# Patient Record
Sex: FEMALE | Race: WHITE | NOT HISPANIC OR LATINO | Employment: OTHER | URBAN - METROPOLITAN AREA
[De-identification: names, ages, dates, MRNs, and addresses within clinical notes are randomized per-mention and may not be internally consistent; named-entity substitution may affect disease eponyms.]

---

## 2017-06-14 ENCOUNTER — HOSPITAL ENCOUNTER (OUTPATIENT)
Dept: MAMMOGRAPHY | Facility: CLINIC | Age: 57
Discharge: HOME | End: 2017-06-14
Attending: FAMILY MEDICINE | Admitting: FAMILY MEDICINE
Payer: COMMERCIAL

## 2017-06-14 DIAGNOSIS — Z12.31 VISIT FOR SCREENING MAMMOGRAM: ICD-10-CM

## 2017-06-14 PROCEDURE — 77063 BREAST TOMOSYNTHESIS BI: CPT

## 2017-06-14 PROCEDURE — G0202 SCR MAMMO BI INCL CAD: HCPCS

## 2017-12-22 ENCOUNTER — TRANSFERRED RECORDS (OUTPATIENT)
Dept: HEALTH INFORMATION MANAGEMENT | Facility: CLINIC | Age: 57
End: 2017-12-22

## 2018-03-28 ENCOUNTER — TELEPHONE (OUTPATIENT)
Dept: NEUROLOGY | Facility: CLINIC | Age: 58
End: 2018-03-28

## 2018-03-28 NOTE — TELEPHONE ENCOUNTER
Spoke with patient. She had a session of cryotherapy where she had to lower her core temp to 40 degrees. She had booties and gloves on but since then her foot that is always numb is more numb. No color changes to suggest frostbite.    She will just observe. Not repeat the session/ If not back to baseline next week she will call back.    Asmita Antoine MD [facility]  Department of Neurology            ----- Message from Katie Garcia RN sent at 3/28/2018  9:53 AM CDT -----  Regarding: FW: Pt saw Dr. Antoine at [facility], has questions  Contact: [phone]  Please see below.... Previous patient of yours. Should I have her come in for an appointment?    Thanks,  Norma  ----- Message -----     From: Katie Garcia RN     Sent: 3/28/2018   7:06 AM       To: Katie Garcia RN  Subject: FW: Pt saw Dr. Antoine at [facility]        ----- Message -----     From: Ria Ko RN     Sent: 3/27/2018   1:04 PM       To: Qing Nath RN  Subject: FW: Pt saw Dr. Antoine at [facility]        ----- Message -----     From: Lucrecia Saldana     Sent: 3/27/2018  12:58 PM       To: [facility] Adult [facility]  Subject: Pt saw Dr. Antoine at [facility], h#    Pt established with Dr. Antoine at [facility] calling needing advice. Pt has MS and numbness of the feet. She did a 2 minute cryotherapy session for her birthday today and her R foot is very numb now.   Pt would appreciate a call back to discuss what she should do.  Pt can be reached at [phone].    Thank you!  ~Lucrecia    Please DO NOT send this message and/or reply back to sender. Call Center Representatives DO NOT respond to messages.

## 2019-11-06 ENCOUNTER — TELEPHONE (OUTPATIENT)
Dept: NEUROLOGY | Facility: CLINIC | Age: 59
End: 2019-11-06

## 2019-11-06 NOTE — TELEPHONE ENCOUNTER
[facility] Call Center    Phone Message    May a detailed message be left on voicemail: no    Reason for Call: Other: Pt saw Dr. Antoine at [facility] and wants to speak with her directly about her original diagnosis and health updates.      Action Taken: Message routed to:  [facility]OLOGY ADULT [facility]   No

## 2019-11-08 NOTE — TELEPHONE ENCOUNTER
Called pt. emphasized she needs to be seen as I have not seen here in this clinic.    She asks if she needs an MRI brain. She has no new symptoms. Her integrative medicine doctor asked if she should have MRI brain. I don't think she needs additional imaging.    Asmita Antoine MD [facility]  Department of Neurology  Pager [phone]

## 2020-01-08 ENCOUNTER — DOCUMENTATION ONLY (OUTPATIENT)
Dept: CARE COORDINATION | Facility: CLINIC | Age: 60
End: 2020-01-08

## 2020-02-20 ENCOUNTER — PRE VISIT (OUTPATIENT)
Dept: NEUROLOGY | Facility: CLINIC | Age: 60
End: 2020-02-20

## 2020-02-20 ENCOUNTER — TELEPHONE (OUTPATIENT)
Dept: NEUROLOGY | Facility: CLINIC | Age: 60
End: 2020-02-20

## 2020-02-20 NOTE — TELEPHONE ENCOUNTER
[facility] Call Center    Phone Message    May a detailed message be left on voicemail: yes     Reason for Call: Other: The pt is asking if she should get an MRI prior to seeing Dr Antoine at 3.10.20? Please call the pt to discuss. Thanks.    Action Taken: Message routed to:  [facility]    Travel Screening: Not Applicable

## 2020-02-20 NOTE — TELEPHONE ENCOUNTER
FUTURE VISIT INFORMATION      FUTURE VISIT INFORMATION:    Date: 3/10/2020    Time: 130pm    [place] [facility] [place] INFORMATION:    Referring provider:  Self     Referring providers clinic:      Reason for visit/diagnosis      RECORDS REQUESTED FROM:       Clinic name Comments Records Status Imaging Status   [facility]   Requested  Requested                                    -2/27/2020-Received fax from [facility] saying patient will need [facility] for records. Spoke with patient and she will be back in town Monday 3/2/2020 and will go and sign a release with [facility] and have records faxed to us-MR @ 3pm     3/9/2020-M for patient asking for update on her records from [facility]. Asked her to call them today to get records faxed and to give us a call with an update on the records-MR@[id number]     -5/6/2020-Sent patient an email asking for an update on the status of her records from [facility]-MR @ 710AM     -5/11/2020-Records from [facility] scanned to Media tab-MR @ 520AM

## 2020-02-21 NOTE — TELEPHONE ENCOUNTER
I talked with Madeleine regarding her request for MRI imaging prior to appointment. I let her know she will need to re-establish care with Dr. Antoine. Once Dr. Antoine see's her she can determine what imaging/testing is needed.     Rubi CHAVIRA

## 2020-05-11 ENCOUNTER — VIRTUAL VISIT (OUTPATIENT)
Dept: NEUROLOGY | Facility: CLINIC | Age: 60
End: 2020-05-11
Payer: COMMERCIAL

## 2020-05-11 DIAGNOSIS — G37.9 DEMYELINATING DISEASE (H): ICD-10-CM

## 2020-05-11 NOTE — PROGRESS NOTES
"Madeleine Carlin is a 60 year old female who is being evaluated via a billable video visit.      The patient has been notified of following:     \"This video visit will be conducted via a call between you and your physician/provider. We have found that certain health care needs can be provided without the need for an in-person physical exam.  This service lets us provide the care you need with a video conversation.  If a prescription is necessary we can send it directly to your pharmacy.  If lab work is needed we can place an order for that and you can then stop by our lab to have the test done at a later time.    Video visits are billed at different rates depending on your insurance coverage.  Please reach out to your insurance provider with any questions.    If during the course of the call the physician/provider feels a video visit is not appropriate, you will not be charged for this service.\"    Patient has given verbal consent for Video visit? Yes    How would you like to obtain your AVS? Mail a copy    Patient would like the video invitation sent by: [phone]    Will anyone else be joining your video visit? No      Video-Visit Details    Type of service:  Video Visit    Video Start Time: 12:00  Video End Time: 12:30    Originating Location (pt. Location): Home    Distant Location (provider location):  [facility] NEUROLOGY     Platform used for Video Visit: NICOLÁS Santos      Reason for visit: Reestablish care    HPI:  Madeleine Carlin is a 60-year-old woman who I have not seen for numerous years.  The patient was previously seen at [facility], [facility] when she had a longitudinally extensive demyelinating attack in her spinal cord.  The attack was in 2009 and left her with some numbness in her feet that is quite painful and some mild spasticity.  She has not had further progression and has declined treatment with any disease modifying drugs.    The patient reports that she has not " noticed any new symptoms but when she does exercise her right leg starts to feel worse and more heavy.  She almost describes a foot drop.  She is wondering if anything can be done about that.    Patient continues to take herbal supplements and follow a gluten-free diet and dairy free diet to control her symptoms.    Past medical history significant for idiopathic longitudinally extensive transverse myelitis, , L5 back surgery, knee surgery    Allergies no known drug allergies  Family history is significant for paternal aunt and brother both of whom have been diagnosed with multiple sclerosis    Social history the patient does not use tobacco nor alcohol.    Review of systems please see HPI all other systems reviewed and negative.    Physical examination: Unable to perform physical examination due to video quality    Impression/recommendations:  1.  Transverse myelitis, longitudinally extensive but stable    The patient can have a repeat MRI imaging this summer after COVID passes just to ensure there is been no disease progression.  If there is consideration for NMO antibody testing could now be done including MOG antibody testing that was not available when the patient first presented in .    The patient is going to observe whether speed makes a difference with her right leg symptoms.  There could be an element of spasticity there that might be amenable to Botox.  She will review and contact me.  She can be referred to PMNR if this is necessary.    We also discussed choice of footwear and if she feels she is getting a foot drop a boot might help.  She could go see prosthetics and orthotics for an ankle-foot orthotic as well although I doubt insurance would pay for it due to lack of significant disability.    I will call with MRI results.     [facility] MD [facility]  Department of Neurology  Pager [phone]

## 2020-08-24 ENCOUNTER — HOSPITAL ENCOUNTER (OUTPATIENT)
Dept: MAMMOGRAPHY | Facility: CLINIC | Age: 60
Discharge: HOME | End: 2020-08-24
Attending: FAMILY MEDICINE | Admitting: FAMILY MEDICINE
Payer: COMMERCIAL

## 2020-08-24 DIAGNOSIS — Z12.31 VISIT FOR SCREENING MAMMOGRAM: ICD-10-CM

## 2020-08-24 PROCEDURE — 77067 SCR MAMMO BI INCL CAD: CPT

## 2020-10-16 ENCOUNTER — TELEPHONE (OUTPATIENT)
Dept: NEUROLOGY | Facility: CLINIC | Age: 60
End: 2020-10-16

## 2020-10-16 NOTE — TELEPHONE ENCOUNTER
I called patient. 10/16/2020 16:41. It is fine for her to proceed with colonoscopy. Left message saying just this.    Asmita Antoine

## 2020-10-16 NOTE — TELEPHONE ENCOUNTER
[facility] Call Center    Phone Message    May a detailed message be left on voicemail: yes     Reason for Call: Other: Question: pt wants to know IF it is okay for her to have a colonscopy, as pt has lesions in lower back and issues with bowel. Please call pt to answer her question. Thank you.     Action Taken: Message routed to:  [facility]):  Neurology    Travel Screening: Not Applicable

## 2020-11-02 ENCOUNTER — OFFICE VISIT (OUTPATIENT)
Dept: SURGERY | Facility: CLINIC | Age: 60
End: 2020-11-02
Payer: COMMERCIAL

## 2020-11-02 VITALS — DIASTOLIC BLOOD PRESSURE: 60 MMHG | WEIGHT: 116 LBS | HEART RATE: 62 BPM | SYSTOLIC BLOOD PRESSURE: 90 MMHG

## 2020-11-02 DIAGNOSIS — R10.32 LEFT INGUINAL PAIN: Primary | ICD-10-CM

## 2020-11-02 PROCEDURE — 99244 OFF/OP CNSLTJ NEW/EST MOD 40: CPT | Performed by: SURGERY

## 2020-11-02 NOTE — PROGRESS NOTES
[facility]  Surgery Consultation    CONSULTATION REQUESTED BY:  Apolonia Renee [phone]    HPI: Patient is a 60-year-old female referred by the above provider for consultation regarding possible recurrent left inguinal hernia.  She reports that at the time of  section some 35 years ago she underwent bilateral inguinal hernia repair.  She is unsure as to whether or not there was mesh used.  Subsequent to other pregnancy she also has a history of other abdominal wall hernia repairs.  In the last several months she has noted intermittent discomfort in her left groin.  This can be activity related but it has also been something that comes on at rest.  She indicates point tenderness within her inguinal canal.  She has not noted a bulge or a lump.  The pain does not radiate into her thigh or medial groin.  She has had no disruption in normal GI function she has continued her otherwise aggressive exercise regiment.    PMH:   has a past medical history of Demyelinating disease (H).  PSH:    has a past surgical history that includes back surgery and orthopedic surgery (2007).  Social History:   reports that she has never smoked. She has never used smokeless tobacco.  Family History:  family history includes Multiple Sclerosis in her brother and paternal aunt.  Medications/Allergies: Home medications and allergies reviewed.    ROS:  The 10 point Review of Systems is negative other than noted in the HPI.    Physical Exam:  BP 90/60   Pulse 62   Wt 52.6 kg (116 lb)   GENERAL: Generally appears well.  Psych: Alert and Oriented.  Normal affect  Eyes: Sclera clear  Respiratory:  Lungs clear to ausculation bilaterally with good air excursion  Cardiovascular:  Regular Rate and Rhythm with no murmurs gallops or rubs, normal peripheral pulses  GI: Abdomen Non Distended Non-Tender  No hernias palpated.  There is very mild periumbilical diastases without obvious hernia.  Groin- I examined the  patient in both the standing and supine positions. Right Groin- No hernia Palpated. Left Groin- No hernia Palpated.  Tenderness palpated in groin.   Lymphatic/Hematologic/Immune:  No femoral or cervical lymphadenopathy.  Integumentary:  No rashes  Neurological: grossly intact     All new lab and imaging data was reviewed.     Impression and Plan:  Patient is a 60 year old female with left inguinal pain of unclear etiology.  No obvious recurrence of hernia at this time    PLAN: We discussed at length her management options.  We discussed the possibility of watchful waiting versus advanced imaging versus surgical exploration.  At present her symptoms are not bad enough to prevent her from being physically active.  She has no signs or symptoms to suggest neurologic entrapment.  This may simply be a musculoskeletal strain related to her aggressive workout regiment versus some degree of chronic overuse.  We discussed the possibility of rest.  I think it is okay for her to continue to do exercise and activity as long as its not painful.  After thorough discussion regarding her management options we have elected to proceed with watchful waiting.  She was instructed on the signs or symptoms to look out for in terms of worsening or more obvious herniation.  She expressed understanding of the consultation and the recommendations.  Her questions were answered to her satisfaction.    Thank you very much for this consult.    Juanpablo Oliveros M.D.  [facility]  [phone]    Please route or send letter to:  Primary Care Provider (PCP) and Referring Provider

## 2024-01-18 ENCOUNTER — TELEPHONE (OUTPATIENT)
Dept: NEUROLOGY | Facility: CLINIC | Age: 64
End: 2024-01-18
Payer: COMMERCIAL

## 2024-01-18 NOTE — TELEPHONE ENCOUNTER
Per clinical team, ok to schedule w/o referral.     Patient Contacted to schedule the following:    Appointment type: New Neurology (patient not seen in the past 3 years, needs 60 min visit to re-establish care)  Provider: Arash  Return date: First available  Specialty phone number: [phone]  Additional appointment(s) needed: N/A  Additional Notes: N/A    Spoke with patient, scheduled video visit on 4/22 at 4pm. Verified demographics/insurance are up to date.    Hieu Nelson on 1/18/2024 at 4:55 PM

## 2024-01-18 NOTE — TELEPHONE ENCOUNTER
"[facility] Call Center    Phone Message    May a detailed message be left on voicemail: yes     Reason for Call:  Patient called to schedule a follow up appointment with , last visit was in 2020. Per protocol after 3 years if not seem in neurology pt is required to have a new order to schedule with specialty. Patient informed of this. Patient is asking writer to send a message to care team asking for consideration as she has been seeing  for 15 years and is silly to start off as a \" new pt/ requiring a referral\" to see provider again. Please review and assist with request to schedule with provider.     Action Taken: Message routed to:  [facility] neurology    Travel Screening: Not Applicable                                                                    "

## 2024-02-26 NOTE — TELEPHONE ENCOUNTER
Records Requested     February 26, 2024 9:28 AM   [id number]   Facility  [facility]   Outcome 9:31 am Sent request for imaging to be pushed to PACS. -ALMA Amin on 3/18/2024 at 8:47 AM Imaging resolved into PACS. -ALMA     RECORDS RECEIVED FROM: Care Everywhere   REASON FOR VISIT: Returning patient. Last visit 5/11/2020/ Demyelinating disease   PROVIDER: Asmita Antoine MD   DATE OF APPT: 4/22/24 @ 4:00 pm    NOTES (FOR ALL VISITS) STATUS DETAILS   OFFICE NOTE from referring provider Internal 1/18/24 (Phone Note)    OFFICE NOTE from other specialist Care Everywhere 5/4/22 Jorge Kwok DPM  @[facility]    5/11/20 Asmita Antoine MD @[facility]-Neuro     MEDICATION LIST Internal    IMAGING  (FOR ALL VISITS)     MRI (HEAD, NECK, SPINE) PACS [facility])  7/21/16 MR Brain  7/21/16 MR Cervical Spine

## 2024-04-22 ENCOUNTER — PRE VISIT (OUTPATIENT)
Dept: NEUROLOGY | Facility: CLINIC | Age: 64
End: 2024-04-22

## 2024-04-22 ENCOUNTER — VIRTUAL VISIT (OUTPATIENT)
Dept: NEUROLOGY | Facility: CLINIC | Age: 64
End: 2024-04-22
Payer: COMMERCIAL

## 2024-04-22 VITALS — HEIGHT: 64 IN | WEIGHT: 115 LBS | BODY MASS INDEX: 19.63 KG/M2

## 2024-04-22 DIAGNOSIS — G37.9 DEMYELINATING DISEASE (H): Primary | ICD-10-CM

## 2024-04-22 DIAGNOSIS — G37.3 IDIOPATHIC TRANSVERSE MYELITIS (H): ICD-10-CM

## 2024-04-22 PROCEDURE — 99203 OFFICE O/P NEW LOW 30 MIN: CPT | Mod: 95 | Performed by: PSYCHIATRY & NEUROLOGY

## 2024-04-22 RX ORDER — DIAZEPAM 5 MG
5 TABLET ORAL
Qty: 1 TABLET | Refills: 0 | Status: SHIPPED | OUTPATIENT
Start: 2024-04-22

## 2024-04-22 ASSESSMENT — PAIN SCALES - GENERAL: PAINLEVEL: NO PAIN (0)

## 2024-04-22 NOTE — PROGRESS NOTES
Virtual Visit Details    Type of service:  Video Visit     Originating Location (pt. Location): Home    Distant Location (provider location):  Off-site  Platform used for Video Visit: Marixa

## 2024-04-22 NOTE — PROGRESS NOTES
Madeleine Carlin MRN# [id number]   Age: 64 year old YOB: 1960     Requesting physician: No ref. provider found  Apolonia Renee            Assessment and Plan:     (G37.9) Demyelinating disease (H)  (primary encounter diagnosis)    (G37.3) Idiopathic transverse myelitis (H)  Comment: in light of worsening weakness recheck imaging. If stable consider PMR referral for Botox or AFO    Plan: MRI brain, C and T spine, avoid rodríguez for now  She is claustrophobic, valium ordered.   I will call with the results.     40 minutes spent caring for the patient on the day of the visit including chart review, visit time and documentation.          Asmita Antoine MD             History of Present Illness:     chief complaint:  Recheck    Madeleine Carlin is a 64 year old patient presenting for assessment of past transverse myelitis.I first took care of her back in 2009 with her initial presentation. Work up for cause was negative. Might have been due to a flu vaccine 1-2 weeks before initial symptoms.     She was last seen in 2020 during the pandemic then lost to follow up.      She reports that overall she is doing well. Her feet are still numb but she has learned to adapt. What she notes are a few weakness issues. She has right drop foot worse in hit and cold and when she walks for long periods. She laos notes her hip seems weak/stiff creating a limp.     Can no longer rollerblade. Still rides a bike.     Bladder function is normal         Physical Exam:     On video gait appears spastic on right leg, worse when she walks quickly.          Pertinent history/data     MRI c spine reviewed from 2016. She did have a disc bulge at C4-5. Some residual signal change C5-6

## 2024-04-22 NOTE — PATIENT INSTRUCTIONS
Imaging Center Scheduling  Please contact us to schedule your imaging visit at any of our [facility] Locations  [facility]  (All Imaging Center locations)  Phone: [phone]    [facility]  ([facility], [facility], [facility])  Phone: [phone]    [facility]  ([facility], [facility])  Phone: [phone]    [facility]   Phone: [phone]

## 2024-04-22 NOTE — NURSING NOTE
Is the patient currently in the state of [place]? YES    Visit mode:VIDEO    If the visit is dropped, the patient can be reconnected by: VIDEO VISIT: Text to cell phone:   Telephone Information:   Mobile [phone]       Will anyone else be joining the visit? NO  (If patient encounters technical issues they should call [phone]    How would you like to obtain your AVS? MyChart    Are changes needed to the allergy or medication list? No    Are refills needed on medications prescribed by this physician? NO    Reason for visit: Consult    Emily SANCHEZ

## 2024-04-26 ENCOUNTER — TELEPHONE (OUTPATIENT)
Dept: NEUROLOGY | Facility: CLINIC | Age: 64
End: 2024-04-26
Payer: COMMERCIAL

## 2024-04-26 NOTE — TELEPHONE ENCOUNTER
Left Voicemail (1st Attempt) for the patient to call back and schedule the following:    Appointment type: Scheduling Imaging  Provider: Per Dr. Antoine  Return date: next avail  Specialty phone number: [phone]  Additional appointment(s) needed:   Additonal Notes:

## 2024-04-30 ENCOUNTER — TELEPHONE (OUTPATIENT)
Dept: NEUROLOGY | Facility: CLINIC | Age: 64
End: 2024-04-30
Payer: COMMERCIAL

## 2024-08-01 ENCOUNTER — ANCILLARY PROCEDURE (OUTPATIENT)
Dept: MRI IMAGING | Facility: CLINIC | Age: 64
End: 2024-08-01
Attending: PSYCHIATRY & NEUROLOGY
Payer: COMMERCIAL

## 2024-08-01 DIAGNOSIS — G37.3 IDIOPATHIC TRANSVERSE MYELITIS (H): ICD-10-CM

## 2024-08-01 PROCEDURE — 72146 MRI CHEST SPINE W/O DYE: CPT

## 2024-08-09 ENCOUNTER — TELEPHONE (OUTPATIENT)
Dept: NEUROLOGY | Facility: CLINIC | Age: 64
End: 2024-08-09
Payer: COMMERCIAL

## 2024-08-09 DIAGNOSIS — G37.9 DEMYELINATING DISEASE (H): Primary | ICD-10-CM

## 2024-08-09 NOTE — TELEPHONE ENCOUNTER
[facility]    Phone Message    May a detailed message be left on voicemail: yes     Reason for Call: Requesting Results     Name/type of test: MRI  Date of test: 8/1/2024  Was test done at a location other than [facility] (Please fill in the location if not [facility])?: No    Pt is requesting a call back to discuss results on 8/12 either around 9am or in the afternoon. Please call back to advise at # [phone].    Action Taken: Message routed to:  [facility]): Neurology    Travel Screening: Not Applicable     Date of Service:

## 2025-03-20 ENCOUNTER — TRANSFERRED RECORDS (OUTPATIENT)
Dept: HEALTH INFORMATION MANAGEMENT | Facility: CLINIC | Age: 65
End: 2025-03-20
Payer: COMMERCIAL

## 2025-03-26 ENCOUNTER — DOCUMENTATION ONLY (OUTPATIENT)
Dept: NEUROLOGY | Facility: CLINIC | Age: 65
End: 2025-03-26
Payer: COMMERCIAL

## 2025-03-26 NOTE — PROGRESS NOTES
Initial Evaluation orders for physical therapy have been received from [facility], orders placed in Dr. Jacobo's folder for review and signature.   Luke Flores EMT March 26, 2025

## 2025-03-27 NOTE — PROGRESS NOTES
Initial evaluation for physical therapy has been signed and faxed back at [phone].  Luke Flores EMT March 27, 2025